# Patient Record
Sex: FEMALE | Race: BLACK OR AFRICAN AMERICAN | NOT HISPANIC OR LATINO | URBAN - METROPOLITAN AREA
[De-identification: names, ages, dates, MRNs, and addresses within clinical notes are randomized per-mention and may not be internally consistent; named-entity substitution may affect disease eponyms.]

---

## 2024-08-07 ENCOUNTER — OFFICE VISIT (OUTPATIENT)
Dept: ORTHOPEDIC SURGERY | Facility: HOSPITAL | Age: 49
End: 2024-08-07
Payer: COMMERCIAL

## 2024-08-07 DIAGNOSIS — G56.03 BILATERAL CARPAL TUNNEL SYNDROME: Primary | ICD-10-CM

## 2024-08-07 PROCEDURE — 99213 OFFICE O/P EST LOW 20 MIN: CPT | Performed by: STUDENT IN AN ORGANIZED HEALTH CARE EDUCATION/TRAINING PROGRAM

## 2024-08-07 ASSESSMENT — PAIN - FUNCTIONAL ASSESSMENT: PAIN_FUNCTIONAL_ASSESSMENT: 0-10

## 2024-08-07 ASSESSMENT — PAIN SCALES - GENERAL: PAINLEVEL_OUTOF10: 10 - WORST POSSIBLE PAIN

## 2024-08-07 ASSESSMENT — PAIN DESCRIPTION - DESCRIPTORS: DESCRIPTORS: ACHING

## 2024-08-07 NOTE — PROGRESS NOTES
History of Present Illness:  The patient presents today for evaluation of side: bilateral hand pain (R>L).  The patient endorses numbness and tingling of all 5 digits. She is RHD. She does endorse occasional pain in her neck but has never had a formal evaluation of her spine. The patient has tried to the following interventions thus far: Tylenol, NSAIDS, and right wrist brace .  The patient notes the pain is worse with activity such as driving or talking on the phone and at night. She has had difficulty with pinch for the last 6 months and has been unable to make a fist on the right. The patient denies any recent trauma.  The pain is sharp, electrical in nature.     She does have a history of insulin-dependent diabetes and hypothyroidism. Denies numbness or tingling in her feet. She works as a nursing aid and recently moved to the  from [place] where she was on a waiting list for R carpal tunnel release.     8/7/2024 follow-up:  She presents for follow-up today.  She continues to have numbness and tingling primarily in the median nerve distribution.  She has tried Tylenol, NSAIDs, and wrist bracing with no relief.  She recently had an EMG which demonstrates severe carpal tunnel syndrome.        Past Medical History:   Diagnosis Date    Encounter for screening for infections with a predominantly sexual mode of transmission 11/25/2015    Screening for STD (sexually transmitted disease)       Medication Documentation Review Audit       Reviewed by Flory Ann MA (Medical Assistant) on 08/07/24 at 0848      Medication Order Taking? Sig Documenting Provider Last Dose Status   amLODIPine (Norvasc) 10 mg tablet [id number]  Take 1 tablet (10 mg) by mouth once daily. Irish Berry MD  Active   diclofenac sodium (Voltaren) 1 % gel [id number]  Apply 4.5 inches (4 g) topically 4 times a day as needed (joint pain). Apply sparingly to affected area(s). Irish Berry MD  Active   levothyroxine (Synthroid,  Levoxyl) 50 mcg tablet [id number]  Take 1 tablet (50 mcg) by mouth once daily. Irish Berry MD  Active   losartan (Cozaar) 50 mg tablet [id number]  Take 1 tablet (50 mg) by mouth once daily. Irish Berry MD  Active                    No Known Allergies    Social History     Socioeconomic History    Marital status:      Spouse name: Not on file    Number of children: Not on file    Years of education: Not on file    Highest education level: Not on file   Occupational History    Not on file   Tobacco Use    Smoking status: Never     Passive exposure: Never    Smokeless tobacco: Never   Substance and Sexual Activity    Alcohol use: Never    Drug use: Never    Sexual activity: Not on file   Other Topics Concern    Not on file   Social History Narrative    Not on file     Social Determinants of Health     Financial Resource Strain: Not on file   Food Insecurity: Not on file   Transportation Needs: Not on file   Physical Activity: Not on file   Stress: Not on file   Social Connections: Not on file   Intimate Partner Violence: Not on file   Housing Stability: Not on file       Past Surgical History:   Procedure Laterality Date    HERNIA REPAIR  09/09/2014    Hernia Repair          Yes History of diabetes or hypothyroidism.     Review of Systems   GENERAL: Negative for malaise, significant weight loss, fever  MUSCULOSKELETAL: see HPI  NEURO:  Negative     Physical Examination:  No tenderness to palpation cervical spine  Good ROM of neck  Negative Spurlings test     side: bilateral wrist/hand:  No obvious swelling or masses  Thenar eminence muscle mass: Thenar: severe atrophy of the right thenar eminence, no atrophy on left.  positive Tinel's at carpal tunnel and cubital tunnel  + Median nerve compression test bilaterally   + Carmina's test bilaterally   Decreased sensation to light touch and 2 point discrimination in median nerve distribution. 2 point discimination not reliably intact at 8 mm to all  fingers.  Positive elbow flexion test.  Motor exam within normal limits  Radial pulse palpable  Good capillary refill     [id number] bilateral severe carpal tunnel syndrome which is worse on the right.    Additional studies: XR bilateral hand with mild 1st CMC arthritis of the right hand.      Assessment:  Patient with side: bilateral carpal tunnel syndrome, bilateral cubital tunnel syndrome and right 1st CMC arthritis,      Plan:  We reviewed the disease process with the patient.  We discussed the role for electrodiagnostic testing and treatment decision making, immobilization, and injections.  We discussed surgical intervention reviewing the risks (neurologic injury, wound issues including infection, pillar pain, and recurrence) and benefits.  The patient elected for: Carpal Tunnel Release Surgery.     Proceed with carpal tunnel release.      For Surgical Planning:  Diagnosis: Bilateral carpal tunnel syndrome  Procedure: Right carpal tunnel release  CPT: 51491  Anesthesia: MAC  Duration: 30 minutes  Special Equipment Needed: None  Location: Anywhere  Initial Post Operative Visit: 2 weeks      Dictation performed with the use of voice recognition software. Syntax and grammatical errors may exist.

## 2024-08-24 ENCOUNTER — HOSPITAL ENCOUNTER (OUTPATIENT)
Dept: RADIOLOGY | Facility: HOSPITAL | Age: 49
Discharge: HOME | End: 2024-08-24
Payer: COMMERCIAL

## 2024-08-24 VITALS — BODY MASS INDEX: 27.68 KG/M2 | HEIGHT: 60 IN | WEIGHT: 141 LBS

## 2024-08-24 DIAGNOSIS — Z12.31 ENCOUNTER FOR SCREENING MAMMOGRAM FOR MALIGNANT NEOPLASM OF BREAST: ICD-10-CM

## 2024-08-24 PROCEDURE — 77067 SCR MAMMO BI INCL CAD: CPT

## 2024-08-24 PROCEDURE — 77067 SCR MAMMO BI INCL CAD: CPT | Performed by: RADIOLOGY

## 2024-08-24 PROCEDURE — 77063 BREAST TOMOSYNTHESIS BI: CPT | Performed by: RADIOLOGY
